# Patient Record
Sex: FEMALE | Race: WHITE | NOT HISPANIC OR LATINO | URBAN - METROPOLITAN AREA
[De-identification: names, ages, dates, MRNs, and addresses within clinical notes are randomized per-mention and may not be internally consistent; named-entity substitution may affect disease eponyms.]

---

## 2019-01-25 ENCOUNTER — OUTPATIENT (OUTPATIENT)
Dept: OUTPATIENT SERVICES | Facility: HOSPITAL | Age: 64
LOS: 1 days | End: 2019-01-25
Payer: COMMERCIAL

## 2019-01-25 ENCOUNTER — APPOINTMENT (OUTPATIENT)
Dept: ANESTHESIOLOGY | Facility: CLINIC | Age: 64
End: 2019-01-25

## 2019-01-25 DIAGNOSIS — M54.16 RADICULOPATHY, LUMBAR REGION: ICD-10-CM

## 2019-01-25 PROCEDURE — 64483 NJX AA&/STRD TFRM EPI L/S 1: CPT

## 2019-02-08 ENCOUNTER — TRANSCRIPTION ENCOUNTER (OUTPATIENT)
Age: 64
End: 2019-02-08

## 2019-05-22 ENCOUNTER — APPOINTMENT (OUTPATIENT)
Dept: ORTHOPEDIC SURGERY | Facility: CLINIC | Age: 64
End: 2019-05-22
Payer: COMMERCIAL

## 2019-05-22 VITALS
SYSTOLIC BLOOD PRESSURE: 150 MMHG | HEIGHT: 61 IN | WEIGHT: 125 LBS | DIASTOLIC BLOOD PRESSURE: 79 MMHG | HEART RATE: 74 BPM | BODY MASS INDEX: 23.6 KG/M2

## 2019-05-22 DIAGNOSIS — M16.12 UNILATERAL PRIMARY OSTEOARTHRITIS, LEFT HIP: ICD-10-CM

## 2019-05-22 DIAGNOSIS — M54.5 LOW BACK PAIN: ICD-10-CM

## 2019-05-22 DIAGNOSIS — Z86.718 PERSONAL HISTORY OF OTHER VENOUS THROMBOSIS AND EMBOLISM: ICD-10-CM

## 2019-05-22 DIAGNOSIS — Z84.89 FAMILY HISTORY OF OTHER SPECIFIED CONDITIONS: ICD-10-CM

## 2019-05-22 DIAGNOSIS — M43.17 SPONDYLOLISTHESIS, LUMBOSACRAL REGION: ICD-10-CM

## 2019-05-22 DIAGNOSIS — Z86.79 PERSONAL HISTORY OF OTHER DISEASES OF THE CIRCULATORY SYSTEM: ICD-10-CM

## 2019-05-22 DIAGNOSIS — Z80.9 FAMILY HISTORY OF MALIGNANT NEOPLASM, UNSPECIFIED: ICD-10-CM

## 2019-05-22 DIAGNOSIS — Z87.39 PERSONAL HISTORY OF OTHER DISEASES OF THE MUSCULOSKELETAL SYSTEM AND CONNECTIVE TISSUE: ICD-10-CM

## 2019-05-22 DIAGNOSIS — M51.26 OTHER INTERVERTEBRAL DISC DISPLACEMENT, LUMBAR REGION: ICD-10-CM

## 2019-05-22 PROCEDURE — 99204 OFFICE O/P NEW MOD 45 MIN: CPT

## 2019-05-22 PROCEDURE — 72110 X-RAY EXAM L-2 SPINE 4/>VWS: CPT

## 2019-05-23 PROBLEM — Z87.39 HISTORY OF SPINAL STENOSIS: Status: RESOLVED | Noted: 2019-05-22 | Resolved: 2019-05-23

## 2019-05-23 PROBLEM — Z86.79 HISTORY OF HYPERTENSION: Status: RESOLVED | Noted: 2019-05-22 | Resolved: 2019-05-23

## 2019-05-23 PROBLEM — M43.17 ACQUIRED SPONDYLOLISTHESIS OF LUMBOSACRAL REGION: Status: ACTIVE | Noted: 2019-05-23

## 2019-05-23 PROBLEM — Z84.89 FAMILY HISTORY OF GENETIC DISORDER: Status: ACTIVE | Noted: 2019-05-22

## 2019-05-23 PROBLEM — Z80.9 FAMILY HISTORY OF MALIGNANT NEOPLASM: Status: ACTIVE | Noted: 2019-05-22

## 2019-05-23 PROBLEM — M51.26 HNP (HERNIATED NUCLEUS PULPOSUS), LUMBAR: Status: RESOLVED | Noted: 2019-05-22 | Resolved: 2019-05-23

## 2019-05-23 PROBLEM — M54.5 LOWER BACK PAIN: Status: ACTIVE | Noted: 2019-05-23

## 2019-05-23 PROBLEM — Z86.718 HISTORY OF BLOOD CLOTS: Status: RESOLVED | Noted: 2019-05-22 | Resolved: 2019-05-23

## 2019-05-23 PROBLEM — M16.12 ARTHRITIS OF LEFT HIP: Status: ACTIVE | Noted: 2019-05-23

## 2019-05-23 RX ORDER — NIFEDIPINE 90 MG
TABLET, EXTENDED RELEASE ORAL
Refills: 0 | Status: ACTIVE | COMMUNITY

## 2019-05-23 RX ORDER — GABAPENTIN 100 MG/1
CAPSULE ORAL
Refills: 0 | Status: ACTIVE | COMMUNITY

## 2019-05-24 ENCOUNTER — TRANSCRIPTION ENCOUNTER (OUTPATIENT)
Age: 64
End: 2019-05-24

## 2019-12-06 ENCOUNTER — OUTPATIENT (OUTPATIENT)
Dept: OUTPATIENT SERVICES | Facility: HOSPITAL | Age: 64
LOS: 1 days | Discharge: HOME | End: 2019-12-06

## 2019-12-06 DIAGNOSIS — D68.2 HEREDITARY DEFICIENCY OF OTHER CLOTTING FACTORS: ICD-10-CM

## 2019-12-09 ENCOUNTER — RESULT REVIEW (OUTPATIENT)
Age: 64
End: 2019-12-09

## 2019-12-09 ENCOUNTER — APPOINTMENT (OUTPATIENT)
Dept: HEMATOLOGY ONCOLOGY | Facility: CLINIC | Age: 64
End: 2019-12-09
Payer: COMMERCIAL

## 2019-12-09 VITALS
TEMPERATURE: 98.7 F | OXYGEN SATURATION: 98 % | RESPIRATION RATE: 17 BRPM | HEART RATE: 74 BPM | SYSTOLIC BLOOD PRESSURE: 152 MMHG | WEIGHT: 127.87 LBS | HEIGHT: 61.85 IN | BODY MASS INDEX: 23.53 KG/M2 | DIASTOLIC BLOOD PRESSURE: 86 MMHG

## 2019-12-09 DIAGNOSIS — F17.200 NICOTINE DEPENDENCE, UNSPECIFIED, UNCOMPLICATED: ICD-10-CM

## 2019-12-09 DIAGNOSIS — Z82.3 FAMILY HISTORY OF STROKE: ICD-10-CM

## 2019-12-09 LAB
BASOPHILS # BLD AUTO: 0.1 K/UL — SIGNIFICANT CHANGE UP (ref 0–0.2)
BASOPHILS NFR BLD AUTO: 1.1 % — SIGNIFICANT CHANGE UP (ref 0–2)
EOSINOPHIL # BLD AUTO: 0.3 K/UL — SIGNIFICANT CHANGE UP (ref 0–0.5)
EOSINOPHIL NFR BLD AUTO: 5.9 % — SIGNIFICANT CHANGE UP (ref 0–6)
HCT VFR BLD CALC: 42.8 % — SIGNIFICANT CHANGE UP (ref 34.5–45)
HGB BLD-MCNC: 14.2 G/DL — SIGNIFICANT CHANGE UP (ref 11.5–15.5)
LYMPHOCYTES # BLD AUTO: 1.5 K/UL — SIGNIFICANT CHANGE UP (ref 1–3.3)
LYMPHOCYTES # BLD AUTO: 29 % — SIGNIFICANT CHANGE UP (ref 13–44)
MCHC RBC-ENTMCNC: 31.9 PG — SIGNIFICANT CHANGE UP (ref 27–34)
MCHC RBC-ENTMCNC: 33.1 G/DL — SIGNIFICANT CHANGE UP (ref 32–36)
MCV RBC AUTO: 96.2 FL — SIGNIFICANT CHANGE UP (ref 80–100)
MONOCYTES # BLD AUTO: 0.4 K/UL — SIGNIFICANT CHANGE UP (ref 0–0.9)
MONOCYTES NFR BLD AUTO: 8.3 % — SIGNIFICANT CHANGE UP (ref 2–14)
NEUTROPHILS # BLD AUTO: 3 K/UL — SIGNIFICANT CHANGE UP (ref 1.8–7.4)
NEUTROPHILS NFR BLD AUTO: 55.7 % — SIGNIFICANT CHANGE UP (ref 43–77)
PLATELET # BLD AUTO: 226 K/UL — SIGNIFICANT CHANGE UP (ref 150–400)
RBC # BLD: 4.45 M/UL — SIGNIFICANT CHANGE UP (ref 3.8–5.2)
RBC # FLD: 11.3 % — SIGNIFICANT CHANGE UP (ref 10.3–14.5)
WBC # BLD: 5.3 K/UL — SIGNIFICANT CHANGE UP (ref 3.8–10.5)
WBC # FLD AUTO: 5.3 K/UL — SIGNIFICANT CHANGE UP (ref 3.8–10.5)

## 2019-12-09 PROCEDURE — 99204 OFFICE O/P NEW MOD 45 MIN: CPT

## 2019-12-10 ENCOUNTER — LABORATORY RESULT (OUTPATIENT)
Age: 64
End: 2019-12-10

## 2019-12-16 LAB
APCR PPP: 1.96 RATIO
FVL BLANK: 38.7
FVL TEST: 76

## 2019-12-16 NOTE — REVIEW OF SYSTEMS
[Negative] : Psychiatric [de-identified] : Brittle skin.   [de-identified] : Paresthesias in the LE

## 2019-12-16 NOTE — HISTORY OF PRESENT ILLNESS
[de-identified] : This is a 64 year old woman who presents for recommendations on a heterozygous Factor V Leiden mutation that was diagnosed in 1996 after a provoked DVT in the left leg during a pregnancy.  Patient was treated with heparin followed by coumadin.  Has not had a Venous thromboembolism since.  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 During the next thyroid surgery had prophylaxis with 6 weeks of coumadin Following the completion of a thyroidectomy since the rapidly growing benign thyroid mass had returned, no complications to this surgery. Patient then proceeded to have multiple high risks situations such as airplane flights where she used lovenox LMWH for DVT prophylaxis and had no incidents.She has suffered from bilateral hip pain for 22 years, worse on the left side, has a left hip replacement planned and presents for hematologic evaluation prior to a planed left hip replacement, date to be determined.  This is to be done at [facility] with Dr. Todd Page.

## 2019-12-16 NOTE — ASSESSMENT
[FreeTextEntry1] : This is a 64 year old woman with a history of hypothyroidism secondary to thyroidectomy first a palatial treated in 2001 complicated by a sensory stroke, SCD prophylaxis, and a 2nd in 2005 when she received prophylaxis with 6 weeks of coumadin without incident.  Patient has been on several high risk situations such as long airplane flights without incidence with Lovenox prophylaxis.  Given history of Factor V Leiden, she is a good historian and specifically remembers the heterozygous nature of the testing.  Will get the prior records of the Factor V Leiden rather than re run the genetic test.  Will check Activated protein C resistance as this is the functional test for the Factor V Leiden.  The heterozygous Factor V Leiden state is a mild hypercoagulable disorder that increases the risk of VTE events by a factor of about 3-10 fold, and is not sufficient to warrant long term anticoagulation. Clinically she did well without long term anticoagulation for the last 23 years.  The Factor V Leiden probably did not play a role in her 2001 sensory stroke as its effects is more in the venous thromboembolism risk.  \par \par Assuming that the APCR ratio is not particularly low functioning, should receive DVT prophylaxis with LMWH such as enoxaparin 1.5mg/kg daily or 80mg SQ daily for 10-14 days following left hip replacement. WOuld go for the upper limit of 14 days to be sure. \par \par Spent > 45 minutes in direct patient care and and addressed all questions and concerns.  >50% of this time was in direct face to face contact with patient during exam and counseling. \par The consultation room and exam room door was closed whenever appropriate for the duration of the consultation. \par \par Addendum\par 12/16/19\par Patient's Bloodwork shows an APCR ratio 1.96 consistent with history of Factor V Leiden  heterozygous mutation.  Also we did track down the original report from 2/26/96 confirming Factor V Leiden heterozygous mutation. This would increase her risk of DVT/PE by a power of between 3-10 ofld, however is not severe enough to warrant long term anticoagulation.  Reccomended routine post procedure prophylaxis for example Lovenox 1.5mg/kg  which at roughly 58 kg rounds out to about lovenox 90mg SQ daily 14 days.  Patient is cleared for upcoming left hip replacement with Dr. Todd Vigdorchik with the above recommendations for DVT prophylaxis and optimizaiton \par Fax" [phone]\par

## 2020-05-06 ENCOUNTER — EMERGENCY (EMERGENCY)
Facility: HOSPITAL | Age: 65
LOS: 1 days | Discharge: HOME | End: 2020-05-06
Attending: STUDENT IN AN ORGANIZED HEALTH CARE EDUCATION/TRAINING PROGRAM | Admitting: EMERGENCY MEDICINE
Payer: COMMERCIAL

## 2020-05-06 VITALS
SYSTOLIC BLOOD PRESSURE: 128 MMHG | HEART RATE: 105 BPM | OXYGEN SATURATION: 98 % | DIASTOLIC BLOOD PRESSURE: 79 MMHG | TEMPERATURE: 99 F | RESPIRATION RATE: 16 BRPM

## 2020-05-06 VITALS
HEART RATE: 81 BPM | TEMPERATURE: 98 F | DIASTOLIC BLOOD PRESSURE: 71 MMHG | SYSTOLIC BLOOD PRESSURE: 128 MMHG | OXYGEN SATURATION: 98 % | RESPIRATION RATE: 16 BRPM

## 2020-05-06 LAB
ALBUMIN SERPL ELPH-MCNC: 4.2 G/DL — SIGNIFICANT CHANGE UP (ref 3.3–5)
ALP SERPL-CCNC: 77 U/L — SIGNIFICANT CHANGE UP (ref 40–120)
ALT FLD-CCNC: 18 U/L — SIGNIFICANT CHANGE UP (ref 4–33)
ANION GAP SERPL CALC-SCNC: 15 MMO/L — HIGH (ref 7–14)
APTT BLD: 27.9 SEC — SIGNIFICANT CHANGE UP (ref 27.5–36.3)
AST SERPL-CCNC: 21 U/L — SIGNIFICANT CHANGE UP (ref 4–32)
BASE EXCESS BLDV CALC-SCNC: 4.2 MMOL/L — SIGNIFICANT CHANGE UP
BASOPHILS # BLD AUTO: 0.05 K/UL — SIGNIFICANT CHANGE UP (ref 0–0.2)
BASOPHILS NFR BLD AUTO: 0.5 % — SIGNIFICANT CHANGE UP (ref 0–2)
BILIRUB SERPL-MCNC: 0.6 MG/DL — SIGNIFICANT CHANGE UP (ref 0.2–1.2)
BLD GP AB SCN SERPL QL: NEGATIVE — SIGNIFICANT CHANGE UP
BLOOD GAS VENOUS - CREATININE: 1.06 MG/DL — SIGNIFICANT CHANGE UP (ref 0.5–1.3)
BUN SERPL-MCNC: 15 MG/DL — SIGNIFICANT CHANGE UP (ref 7–23)
CALCIUM SERPL-MCNC: 9.4 MG/DL — SIGNIFICANT CHANGE UP (ref 8.4–10.5)
CHLORIDE BLDV-SCNC: 104 MMOL/L — SIGNIFICANT CHANGE UP (ref 96–108)
CHLORIDE SERPL-SCNC: 103 MMOL/L — SIGNIFICANT CHANGE UP (ref 98–107)
CO2 SERPL-SCNC: 25 MMOL/L — SIGNIFICANT CHANGE UP (ref 22–31)
CREAT SERPL-MCNC: 0.98 MG/DL — SIGNIFICANT CHANGE UP (ref 0.5–1.3)
EOSINOPHIL # BLD AUTO: 0.13 K/UL — SIGNIFICANT CHANGE UP (ref 0–0.5)
EOSINOPHIL NFR BLD AUTO: 1.2 % — SIGNIFICANT CHANGE UP (ref 0–6)
GAS PNL BLDV: 137 MMOL/L — SIGNIFICANT CHANGE UP (ref 136–146)
GLUCOSE BLDV-MCNC: 112 MG/DL — HIGH (ref 70–99)
GLUCOSE SERPL-MCNC: 117 MG/DL — HIGH (ref 70–99)
HCO3 BLDV-SCNC: 26 MMOL/L — SIGNIFICANT CHANGE UP (ref 20–27)
HCT VFR BLD CALC: 47.8 % — HIGH (ref 34.5–45)
HCT VFR BLDV CALC: 49.7 % — HIGH (ref 34.5–45)
HGB BLD-MCNC: 15.7 G/DL — HIGH (ref 11.5–15.5)
HGB BLDV-MCNC: 16.3 G/DL — HIGH (ref 11.5–15.5)
IMM GRANULOCYTES NFR BLD AUTO: 0.3 % — SIGNIFICANT CHANGE UP (ref 0–1.5)
INR BLD: 1.02 — SIGNIFICANT CHANGE UP (ref 0.88–1.17)
LACTATE BLDV-MCNC: 1.6 MMOL/L — SIGNIFICANT CHANGE UP (ref 0.5–2)
LYMPHOCYTES # BLD AUTO: 1.11 K/UL — SIGNIFICANT CHANGE UP (ref 1–3.3)
LYMPHOCYTES # BLD AUTO: 10.6 % — LOW (ref 13–44)
MAGNESIUM SERPL-MCNC: 2.2 MG/DL — SIGNIFICANT CHANGE UP (ref 1.6–2.6)
MCHC RBC-ENTMCNC: 29.7 PG — SIGNIFICANT CHANGE UP (ref 27–34)
MCHC RBC-ENTMCNC: 32.8 % — SIGNIFICANT CHANGE UP (ref 32–36)
MCV RBC AUTO: 90.4 FL — SIGNIFICANT CHANGE UP (ref 80–100)
MONOCYTES # BLD AUTO: 0.82 K/UL — SIGNIFICANT CHANGE UP (ref 0–0.9)
MONOCYTES NFR BLD AUTO: 7.9 % — SIGNIFICANT CHANGE UP (ref 2–14)
NEUTROPHILS # BLD AUTO: 8.3 K/UL — HIGH (ref 1.8–7.4)
NEUTROPHILS NFR BLD AUTO: 79.5 % — HIGH (ref 43–77)
NRBC # FLD: 0 K/UL — SIGNIFICANT CHANGE UP (ref 0–0)
OB PNL STL: POSITIVE — SIGNIFICANT CHANGE UP
PCO2 BLDV: 50 MMHG — SIGNIFICANT CHANGE UP (ref 41–51)
PH BLDV: 7.38 PH — SIGNIFICANT CHANGE UP (ref 7.32–7.43)
PLATELET # BLD AUTO: 245 K/UL — SIGNIFICANT CHANGE UP (ref 150–400)
PMV BLD: 10 FL — SIGNIFICANT CHANGE UP (ref 7–13)
PO2 BLDV: 33 MMHG — LOW (ref 35–40)
POTASSIUM BLDV-SCNC: 3.5 MMOL/L — SIGNIFICANT CHANGE UP (ref 3.4–4.5)
POTASSIUM SERPL-MCNC: 4 MMOL/L — SIGNIFICANT CHANGE UP (ref 3.5–5.3)
POTASSIUM SERPL-SCNC: 4 MMOL/L — SIGNIFICANT CHANGE UP (ref 3.5–5.3)
PROT SERPL-MCNC: 7.8 G/DL — SIGNIFICANT CHANGE UP (ref 6–8.3)
PROTHROM AB SERPL-ACNC: 11.7 SEC — SIGNIFICANT CHANGE UP (ref 9.8–13.1)
RBC # BLD: 5.29 M/UL — HIGH (ref 3.8–5.2)
RBC # FLD: 13.8 % — SIGNIFICANT CHANGE UP (ref 10.3–14.5)
RH IG SCN BLD-IMP: POSITIVE — SIGNIFICANT CHANGE UP
SAO2 % BLDV: 59 % — LOW (ref 60–85)
SODIUM SERPL-SCNC: 143 MMOL/L — SIGNIFICANT CHANGE UP (ref 135–145)
WBC # BLD: 10.44 K/UL — SIGNIFICANT CHANGE UP (ref 3.8–10.5)
WBC # FLD AUTO: 10.44 K/UL — SIGNIFICANT CHANGE UP (ref 3.8–10.5)

## 2020-05-06 PROCEDURE — 99284 EMERGENCY DEPT VISIT MOD MDM: CPT

## 2020-05-06 PROCEDURE — 74177 CT ABD & PELVIS W/CONTRAST: CPT | Mod: 26

## 2020-05-06 RX ORDER — CIPROFLOXACIN LACTATE 400MG/40ML
1 VIAL (ML) INTRAVENOUS
Qty: 28 | Refills: 0
Start: 2020-05-06 | End: 2020-05-19

## 2020-05-06 RX ORDER — SODIUM CHLORIDE 9 MG/ML
1000 INJECTION INTRAMUSCULAR; INTRAVENOUS; SUBCUTANEOUS ONCE
Refills: 0 | Status: COMPLETED | OUTPATIENT
Start: 2020-05-06 | End: 2020-05-06

## 2020-05-06 RX ORDER — CIPROFLOXACIN LACTATE 400MG/40ML
500 VIAL (ML) INTRAVENOUS ONCE
Refills: 0 | Status: COMPLETED | OUTPATIENT
Start: 2020-05-06 | End: 2020-05-06

## 2020-05-06 RX ORDER — METRONIDAZOLE 500 MG
500 TABLET ORAL ONCE
Refills: 0 | Status: COMPLETED | OUTPATIENT
Start: 2020-05-06 | End: 2020-05-06

## 2020-05-06 RX ORDER — METRONIDAZOLE 500 MG
1 TABLET ORAL
Qty: 28 | Refills: 0
Start: 2020-05-06 | End: 2020-05-19

## 2020-05-06 RX ADMIN — SODIUM CHLORIDE 1000 MILLILITER(S): 9 INJECTION INTRAMUSCULAR; INTRAVENOUS; SUBCUTANEOUS at 09:04

## 2020-05-06 RX ADMIN — Medication 500 MILLIGRAM(S): at 12:16

## 2020-05-06 NOTE — ED PROVIDER NOTE - NSFOLLOWUPINSTRUCTIONS_ED_ALL_ED_FT
Patient informed of ED visit findings, understands plan.  Please followup with PMD and gastroenterologist in 2-3 days.  Please take antibiotics, ciprofloxacin and metronidazole, as prescribed.  Please return to ED if you have persistent vomiting and are unable to keep any solids or liquids down.  Please return to ED with worsening symptoms or concerns.

## 2020-05-06 NOTE — ED PROVIDER NOTE - PROGRESS NOTE DETAILS
Abraham MUHAMMAD:  imaging and labs reviewed.  Patient reports she feels better, does have mild tenderness of LLQ.  Patient requests to be discharged and states she does not want to stay in hospital.  Given normal lactate and improvements in symptoms, will send home with followup.  Patient states she does not feel nausea and has not since onset.  Will treat colitis with ciprofloxacin and metronidazole.

## 2020-05-06 NOTE — ED PROVIDER NOTE - OBJECTIVE STATEMENT
65 y/o F pmh HTN, L hip replacement, thyroid surgery c/o LLQ abd pain and bloody diarrhea x 2 days. LLQ pain is cramping, constant 5/10, sometimes radiating to L lower back. Did not take anything for the pain. Yesterday pt had multiple episodes of diarrhea, nonbloody. Today pt had multiple episodes of BRBPR. States this never happened before. Pt had a Cologuard performed last year and it was normal. +mild nausea. Denies fever, cp, sob.

## 2020-05-06 NOTE — ED ADULT NURSE REASSESSMENT NOTE - NS ED NURSE REASSESS COMMENT FT1
Pt. has not had any episodes of diarrhea or bloody stool since being in ED. Will continue to monitor.

## 2020-05-06 NOTE — ED PROVIDER NOTE - PATIENT PORTAL LINK FT
You can access the FollowMyHealth Patient Portal offered by [facility] by registering at the following website: [email or web address]. By joining [email or web address]’s FollowMyHealth portal, you will also be able to view your health information using other applications (apps) compatible with our system.

## 2020-05-06 NOTE — ED ADULT NURSE REASSESSMENT NOTE - NS ED NURSE REASSESS COMMENT FT1
Pt. states that she is starting to feel better after receiving the bolus of NS. She is able to walk around on her own without s/s of discomfort. Pt. currently at CT.

## 2020-05-06 NOTE — ED ADULT NURSE NOTE - OBJECTIVE STATEMENT
64yr female walk-in ED today with c/o diarrhea, abdominal pain and bloody stool. Pt. says she had a hip replacement 4 months ago. Denies being on any blood thinners. She states she has a hx of HTN and takes BP medication at night. Pt. states that she had abdominal pain to LLQ that began yesterday, followed by bloody diarrhea last night. She states that there were clots in diarrhea. Pt. also states that she hasn't been able to eat or drink as it comes out as diarrhea. Abdomen non-distended and tender upon palpation of LLQ area. Abdominal pain currently 4/10 which pt. says is acceptable level for her and is currently refusing pain medication. She says that she also has intermittent muscle cramps in bilateral legs d/t dehydration, and refuses medication as well as cold/heat packs. Skin is clean, dry and intact. Respirations even and unlabored. IV inserted in LAC 20G, labs drawn as ordered. Fluid bolus ordered and hung. Pt. laying comfortably in bed. Will continue to monitor.

## 2020-05-06 NOTE — ED PROVIDER NOTE - PHYSICAL EXAMINATION
Vital signs reviewed.   CONSTITUTIONAL: Well-appearing; well-nourished; in no apparent distress. Non-toxic appearing.   HEAD: Normocephalic, atraumatic.  EYES: PERRL, EOM intact, conjunctiva and sclera WNL.  ENT: normal nose; no rhinorrhea; normal pharynx with no tonsillar hypertrophy, no erythema, no exudate, no lymphadenopathy. no mucosal lesions   NECK/LYMPH: Supple; non-tender;   CARD: Tachycardia, regular rhythm  RESP: Normal chest excursion with respiration; breath sounds clear and equal bilaterally; no wheezes, rhonchi, or rales.  ABD/GI: soft, non-distended; TENDER LLQ  EXT/MS: moves all extremities;   SKIN: Normal for age and race; warm; no rashes noted.   NEURO: Awake, alert, oriented x 3  PSYCH: Normal mood; appropriate affect.

## 2020-05-06 NOTE — ED PROVIDER NOTE - NSFOLLOWUPCLINICS_GEN_ALL_ED_FT
[facility]  Gastroenterology  [address] [place] [id number]  Phone: [phone]  Fax:   Follow Up Time:

## 2020-05-06 NOTE — ED PROVIDER NOTE - CLINICAL SUMMARY MEDICAL DECISION MAKING FREE TEXT BOX
65 y/o F pmh HTN, L hip replacement, thyroid surgery c/o LLQ abd pain and bloody diarrhea x 2 days.  well appearing female, NAD slight tachycardia, tender llq  colitis vs diverticulitis  -labs, ctap, ivf, dispo per findings

## 2020-05-06 NOTE — ED PROVIDER NOTE - ATTENDING CONTRIBUTION TO CARE
MD Rosario: patient seen and evaluated with the PA.  I was present for key portions of the History and Physical, and I agree with the Impression and Plan.      MD Rosario: 64F PMH HTN presents with 1 day history of abdominal pain and diarrhea with BRBPR.  Patient reports abd pain is localized to LLQ and nonradiating.  Described as "pressure-like" and 4/10 in severity.  Intermittent.  Patient reports has not taken anything for pain and declines pain medication.  Diarrhea "too numerous" to count.  Reports BRBPR associated with each episode of diarrhea.  Patient reports minimal nausea and denies vomiting, chest pain, dyspnea, cough, fevers, chills, dysuria, hematuria, melena.  Has not had a colonoscopy.  Guaiac test last year was negative.  PE: AAOx3, dry mucous membranes, lungs clear, abd soft tender to LLQ, tachycardic, neuro intact, +hemorrhoid visible.  CT abdomen to rule out diverticulitis/colitis, UA to rule out UTI, labs.  NS.

## 2020-05-07 LAB
CULTURE RESULTS: SIGNIFICANT CHANGE UP
SPECIMEN SOURCE: SIGNIFICANT CHANGE UP

## 2020-11-12 ENCOUNTER — EMERGENCY (EMERGENCY)
Facility: HOSPITAL | Age: 65
LOS: 1 days | Discharge: HOME | End: 2020-11-12
Attending: EMERGENCY MEDICINE | Admitting: EMERGENCY MEDICINE
Payer: COMMERCIAL

## 2020-11-12 VITALS
TEMPERATURE: 98 F | HEART RATE: 83 BPM | RESPIRATION RATE: 18 BRPM | OXYGEN SATURATION: 100 % | DIASTOLIC BLOOD PRESSURE: 87 MMHG | SYSTOLIC BLOOD PRESSURE: 161 MMHG

## 2020-11-12 VITALS — TEMPERATURE: 100 F

## 2020-11-12 PROCEDURE — 72125 CT NECK SPINE W/O DYE: CPT | Mod: 26

## 2020-11-12 PROCEDURE — 70450 CT HEAD/BRAIN W/O DYE: CPT | Mod: 26

## 2020-11-12 PROCEDURE — 99284 EMERGENCY DEPT VISIT MOD MDM: CPT

## 2020-11-12 RX ORDER — DIAZEPAM 5 MG
2 TABLET ORAL ONCE
Refills: 0 | Status: DISCONTINUED | OUTPATIENT
Start: 2020-11-12 | End: 2020-11-12

## 2020-11-12 RX ORDER — DIAZEPAM 5 MG
1 TABLET ORAL
Qty: 12 | Refills: 0
Start: 2020-11-12 | End: 2020-11-14

## 2020-11-12 RX ORDER — LIDOCAINE 4 G/100G
2 CREAM TOPICAL ONCE
Refills: 0 | Status: COMPLETED | OUTPATIENT
Start: 2020-11-12 | End: 2020-11-12

## 2020-11-12 RX ORDER — ACETAMINOPHEN 500 MG
975 TABLET ORAL ONCE
Refills: 0 | Status: COMPLETED | OUTPATIENT
Start: 2020-11-12 | End: 2020-11-12

## 2020-11-12 RX ORDER — KETOROLAC TROMETHAMINE 30 MG/ML
15 SYRINGE (ML) INJECTION ONCE
Refills: 0 | Status: DISCONTINUED | OUTPATIENT
Start: 2020-11-12 | End: 2020-11-12

## 2020-11-12 RX ADMIN — Medication 15 MILLIGRAM(S): at 16:12

## 2020-11-12 RX ADMIN — Medication 975 MILLIGRAM(S): at 18:39

## 2020-11-12 RX ADMIN — LIDOCAINE 2 PATCH: 4 CREAM TOPICAL at 16:13

## 2020-11-12 RX ADMIN — Medication 2 MILLIGRAM(S): at 16:12

## 2020-11-12 NOTE — ED PROVIDER NOTE - PHYSICAL EXAMINATION
CONSTITUTIONAL: NAD. Awake and conversive, cooperative with exam  EYES: EOMI, conjunctiva and sclera clear  ENMT: MMM, no pharyngeal injection or exudates   NECK: Supple, no palpable masses, very sensitive to palpitation, muscle spasms, no paraspinal tenderness, head atraumatic   RESPIRATORY: Normal respiratory effort, CTAB, no wheezes, rales, or rhonchi  CARDIOVASCULAR: RRR, normal S1 and S2, no MRG, no peripheral edema  ABDOMEN: Soft, non-distended, no TTP, no rebound/guarding  NEURO: AO to person, place, and time; following commands, moving all extremities spontaneously; strength 5/5 throughout all extremities, sensation intact throughout all extremities   PSYCH: appropriate affect   SKIN: No rashes; no palpable lesions CONSTITUTIONAL: NAD. Awake and conversive, cooperative with exam  EYES: EOMI, conjunctiva and sclera clear  ENMT: MMM, no pharyngeal injection or exudates   NECK: Stiff, no palpable masses, very sensitive to palpitation, paraspinal muscle spasms, head atraumatic   RESPIRATORY: Normal respiratory effort, CTAB, no wheezes, rales, or rhonchi  CARDIOVASCULAR: RRR, normal S1 and S2, no MRG, no peripheral edema  ABDOMEN: Soft, non-distended, no TTP, no rebound/guarding  NEURO: AO to person, place, and time; following commands, moving all extremities spontaneously; strength 5/5 throughout all extremities, sensation intact throughout all extremities   PSYCH: appropriate affect   SKIN: No rashes; no palpable lesions

## 2020-11-12 NOTE — ED ADULT NURSE NOTE - OBJECTIVE STATEMENT
Pt c/o b/L neck and upper back pain x 4 days  + headache, inability to rotate neck without pain, denies trauma to area, no recent heavy lifting. Send by PMD for further eval. Medicated for pain as per order, awaiting CT.

## 2020-11-12 NOTE — ED PROVIDER NOTE - NSFOLLOWUPINSTRUCTIONS_ED_ALL_ED_FT
(1) Follow up with your primary care physician as discussed. In addition, we did not find evidence of a life threatening illness on your testing here today, but listed below are the specialists that will be necessary to see as an outpatient to continue the workup.  Please call the numbers listed below or [phone] to set up the necessary appointments.  (2) Immediately seek care at your nearest emergency room if your worsen, persist, or do not resolve. Please come back should you have fevers, chills, pain unrelieved by medications, uncontrollable nausea or vomiting, changes in your vision, rash or neck stiffness.   (3) Take Tylenol (up to 1000mg or 1 g)  and/or Motrin (up to 600mg) up to every 6 hours as needed for pain.   (4) Take the prescribed medication as instructed:  - Valium every 6 hours as needed for severe pain or severe muscle spasms, do not take medicine and drive or operate heavy machinery   (5) You may need an MRI to further characterize the cause of your symptoms. Please discuss this further with your PCP and schedule if your symptoms persist.   (6) [facility] should your symptoms persist for further evaluation- see phone number above      Return to the emergency department if:   •You have an injury that causes neck pain and shooting pain down your arms or legs.  •Your neck pain suddenly becomes severe.  •You have neck pain along with numbness, tingling, or weakness in your arms or legs.  •You have a stiff neck, a headache, and a fever.    Contact your healthcare provider if:   •You have new or worsening symptoms.  •Your symptoms continue even after treatment.  •You have questions or concerns about your condition or care.    Medicines:   •NSAIDs, such as ibuprofen, help decrease swelling, pain, and fever. This medicine is available without a doctor's order. Ask your healthcare provider which medicine to take and how often to take it. Follow directions. NSAIDs can cause stomach bleeding or kidney problems if not taken correctly. If you take blood thinner medicine, always ask if NSAIDs are safe for you.    •Acetaminophen helps decrease pain and fever. Ask your healthcare provider how much to take and how often to take it. Follow directions. Acetaminophen can cause liver damage if not taken correctly.    •Steroid medicine may be used to reduce inflammation. This can help relieve pain caused by swelling    •Take your medicine as directed. Contact your healthcare provider if you think your medicine is not helping or if you have side effects. Tell him or her if you are allergic to any medicine. Keep a list of the medicines, vitamins, and herbs you take. Include the amounts, and when and why you take them. Bring the list or the pill bottles to follow-up visits. Carry your medicine list with you in case of an emergency.    Manage or prevent acute neck pain:   •Rest your neck as directed. Do not make sudden movements, such as turning your head quickly. Your healthcare provider may recommend you wear a cervical collar for a short time. The collar will prevent you from moving your head. This will give your neck time to heal if an injury is causing your neck pain. Ask your healthcare provider when you can return to sports or other normal daily activities.    •Apply heat as directed. Heat helps relieve pain and swelling. Use a heat wrap, or soak a small towel in warm water. Wring out the extra water. Apply the heat wrap or towel for 20 minutes every hour, or as directed.    •Apply ice as directed. Ice helps relieve pain and swelling, and can help prevent tissue damage. Use an ice pack, or put ice in a bag. Cover the ice pack or back with a towel before you apply it to your neck. Apply the ice pack or ice for 15 minutes every hour, or as directed. Your healthcare provider can tell you how often to apply ice.    •Do neck exercises as directed. Neck exercises help strengthen the muscles and increase range of motion. Your healthcare provider will tell you which exercises are right for you. He may give you instructions, or he may recommend that you work with a physical therapist. Your healthcare provider or therapist can make sure you are doing the exercises correctly.     •Maintain good posture. Try to keep your head and shoulders lifted when you sit. If you work in front of a computer, make sure the monitor is at the right level. You should not need to look up down to see the screen. You should also not have to lean forward to be able to read what is on the screen. Make sure your keyboard, mouse, and other computer items are placed where you do not have to extend your shoulder to reach them. Get up often if you work in front of a computer or sit for long periods of time. Stretch or walk around to keep your neck muscles loose.    Follow up with your healthcare provider as directed: Your healthcare provider may refer you to a specialist if your pain does not get better with treatment. Write down your questions so you remember to ask them during your visits.

## 2020-11-12 NOTE — ED PROVIDER NOTE - OBJECTIVE STATEMENT
64 yo F p/w 10/10 neck pain that radiates to frontal lobe and down to deltoid muscles bilaterally. Describes it as tearing pain, no N/V/F/C/NS. Drove to [place] 6 days ago, pt assumed it was neck stiffness secondary to long drive. Pain unrelieved by Tylenol or Ibuprofen. Pt took Tylenol, ibuprofen, vicodin from dental extraction procedure all at once last night without any relief. Saw PCP who recommended visit to ED for head/neck CT. Denies vision changes, blurry vision, black spots or floaters. No trauma or falls. Never had anything like this before. No photophobia, no rash, no sick contacts. 65 yr old F, generally healthy, p/w 10/10 neck pain that radiates to head and down to deltoid muscles bilaterally. Describes it as tearing pain, no N/V/F/C/NS. Drove to [place] 6 days ago, pt assumed it was neck stiffness secondary to long drive. Pain unrelieved by Tylenol or Ibuprofen. Pt took Tylenol, ibuprofen, vicodin from dental extraction procedure all at once last night without any relief. Saw PCP who recommended visit to ED for head/neck CT. Denies vision changes, blurry vision, black spots or floaters. No trauma or falls. Never had anything like this before. No photophobia, no rash, no sick contacts.  Neck appears stiff and neck mm are tender.

## 2020-11-12 NOTE — ED PROVIDER NOTE - ATTENDING CONTRIBUTION TO CARE
Attending Attestation: Dr. Rubio  I have personally performed a history and physical examination of the patient and discussed management with the resident as well as the patient.  I reviewed the resident's note and agree with the documented findings and plan of care.  I have authored and modified critical sections of the Provider Note, including but not limited to HPI, Physical Exam and MDM. 66 yo F p/w 10/10 neck pain that radiates to head and down to deltoid muscles bilaterally.  Has e/o cervical mm spasm and no neuro deficits on exam.  Afebrile and denies systemic SSx. No sore throat or other assoc sx.  Atraumatic, but given severity of spasm will obtain CT head, CT neck, give valium, toradol, lidocaine patches and reassess. unclear etiology.

## 2020-11-12 NOTE — ED PROVIDER NOTE - PROGRESS NOTE DETAILS
Resident: Denise Duque – Pt was re-evaluated at bedside, feeling better overall. Pain is currently 7/10 in intensity. Informed her re CT results shwoing paravertebral edema, would need MRI to further characterize. Offered CDU admission for MRI tomorrow, patient refuses to stay. Has good followup with Dr. 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 Reports she will get MRI outpatient. Results were discussed with patient as well as return precautions and follow up plan with PCP and/or specialist. Time was taken to answer any questions that the patient had before providing them with discharge paperwork.

## 2020-11-12 NOTE — ED ADULT TRIAGE NOTE - CHIEF COMPLAINT QUOTE
Pt c/o neck pain since this past Sunday states radiating down rt arm, + headache, inability to rotate neck without pain, denies trauma to area, no recent heavy lifting. PMH: htn

## 2020-11-12 NOTE — ED PROVIDER NOTE - NSFOLLOWUPCLINICS_GEN_ALL_ED_FT
[facility]  Ortho/Spine  [address] [place] [id number]  Phone: [phone]  Fax:   Follow Up Time: 1-3 Days

## 2020-11-12 NOTE — ED ADULT NURSE NOTE - NSIMPLEMENTINTERV_GEN_ALL_ED
Implemented All Universal Safety Interventions:  [place] to call system. Call bell, personal items and telephone within reach. Instruct patient to call for assistance. Room bathroom lighting operational. Non-slip footwear when patient is off stretcher. Physically safe environment: no spills, clutter or unnecessary equipment. Stretcher in lowest position, wheels locked, appropriate side rails in place.

## 2020-11-12 NOTE — ED PROVIDER NOTE - PATIENT PORTAL LINK FT
You can access the FollowMyHealth Patient Portal offered by [facility] by registering at the following website: [email or web address]. By joining [employer]’s FollowMyHealth portal, you will also be able to view your health information using other applications (apps) compatible with our system.

## 2020-11-12 NOTE — ED PROVIDER NOTE - NS ED ROS FT
Gen: No fever, chills, night sweats. Normal appetite  Eyes: No changes in vision   ENT: No ear pain, congestion, sore throat  Resp: No cough or trouble breathing  Cardiovascular: No chest pain or palpitation  Gastroenteric: No nausea, vomiting, diarrhea or constipation  :  No change in urine output, dysuria or hematuria   MS: Neck pain, BL upper arm pain   Skin: No rashes, lacerations, wounds or abrasions   Neuro: +headache; no abnormal movements

## 2020-11-12 NOTE — ED PROVIDER NOTE - CLINICAL SUMMARY MEDICAL DECISION MAKING FREE TEXT BOX
64 yo F p/w 10/10 neck pain that radiates to frontal lobe and down to deltoid muscles bilaterally, c/f migraine headache vs musculoskeletal pain vs disc herniation. Will obtain CT head, CT neck, give valium, toradol, lidocaine patches and reassess. 64 yo F p/w 10/10 neck pain that radiates to head and down to deltoid muscles bilaterally.  Has e/o cervical mm spasm and no neuro deficits on exam.  Afebrile and denies systemic SSx. No sore throat or other assoc sx.  Atraumatic, but given severity of spasm will obtain CT head, CT neck, give valium, toradol, lidocaine patches and reassess. unclear etiology.

## 2024-05-09 PROBLEM — Z78.9 OTHER SPECIFIED HEALTH STATUS: Chronic | Status: ACTIVE | Noted: 2020-11-14

## 2024-05-14 ENCOUNTER — APPOINTMENT (OUTPATIENT)
Dept: ANESTHESIOLOGY | Facility: CLINIC | Age: 69
End: 2024-05-14